# Patient Record
Sex: FEMALE | Race: ASIAN | NOT HISPANIC OR LATINO
[De-identification: names, ages, dates, MRNs, and addresses within clinical notes are randomized per-mention and may not be internally consistent; named-entity substitution may affect disease eponyms.]

---

## 2022-12-05 PROBLEM — Z00.00 ENCOUNTER FOR PREVENTIVE HEALTH EXAMINATION: Status: ACTIVE | Noted: 2022-12-05

## 2022-12-12 PROBLEM — Z86.79 HISTORY OF HYPERTENSION: Status: RESOLVED | Noted: 2022-12-12 | Resolved: 2022-12-12

## 2022-12-12 PROBLEM — Z86.39 HISTORY OF HYPERLIPIDEMIA: Status: RESOLVED | Noted: 2022-12-12 | Resolved: 2022-12-12

## 2022-12-12 PROBLEM — M48.02 CERVICAL SPINAL STENOSIS: Status: RESOLVED | Noted: 2022-12-12 | Resolved: 2022-12-12

## 2022-12-12 PROBLEM — D56.1 BETA+ THALASSEMIA: Status: RESOLVED | Noted: 2022-12-12 | Resolved: 2022-12-12

## 2022-12-12 RX ORDER — FLUTICASONE FUROATE AND VILANTEROL TRIFENATATE 200; 25 UG/1; UG/1
200-25 POWDER RESPIRATORY (INHALATION)
Refills: 0 | Status: ACTIVE | COMMUNITY

## 2022-12-12 RX ORDER — ESOMEPRAZOLE MAGNESIUM 40 MG/1
40 CAPSULE, DELAYED RELEASE ORAL
Refills: 0 | Status: ACTIVE | COMMUNITY

## 2022-12-12 RX ORDER — ALBUTEROL SULFATE 90 UG/1
108 (90 BASE) AEROSOL, METERED RESPIRATORY (INHALATION)
Refills: 0 | Status: ACTIVE | COMMUNITY

## 2022-12-12 RX ORDER — OLMESARTAN MEDOXOMIL 20 MG/1
20 TABLET, FILM COATED ORAL
Refills: 0 | Status: ACTIVE | COMMUNITY

## 2022-12-16 ENCOUNTER — APPOINTMENT (OUTPATIENT)
Dept: THORACIC SURGERY | Facility: CLINIC | Age: 67
End: 2022-12-16

## 2022-12-16 VITALS
DIASTOLIC BLOOD PRESSURE: 70 MMHG | BODY MASS INDEX: 23.74 KG/M2 | WEIGHT: 134 LBS | TEMPERATURE: 97.2 F | HEIGHT: 63 IN | SYSTOLIC BLOOD PRESSURE: 149 MMHG | RESPIRATION RATE: 17 BRPM | OXYGEN SATURATION: 94 % | HEART RATE: 84 BPM

## 2022-12-16 DIAGNOSIS — Z86.79 PERSONAL HISTORY OF OTHER DISEASES OF THE CIRCULATORY SYSTEM: ICD-10-CM

## 2022-12-16 DIAGNOSIS — R45.89 OTHER SYMPTOMS AND SIGNS INVOLVING EMOTIONAL STATE: ICD-10-CM

## 2022-12-16 DIAGNOSIS — F17.200 NICOTINE DEPENDENCE, UNSPECIFIED, UNCOMPLICATED: ICD-10-CM

## 2022-12-16 DIAGNOSIS — D56.1 BETA THALASSEMIA: ICD-10-CM

## 2022-12-16 DIAGNOSIS — Z86.39 PERSONAL HISTORY OF OTHER ENDOCRINE, NUTRITIONAL AND METABOLIC DISEASE: ICD-10-CM

## 2022-12-16 DIAGNOSIS — M48.02 SPINAL STENOSIS, CERVICAL REGION: ICD-10-CM

## 2022-12-16 PROCEDURE — 99203 OFFICE O/P NEW LOW 30 MIN: CPT

## 2022-12-21 PROBLEM — F17.200 CURRENT SMOKER: Status: ACTIVE | Noted: 2022-12-21

## 2022-12-21 PROBLEM — R45.89 DEPRESSED MOOD: Status: ACTIVE | Noted: 2022-12-21

## 2022-12-21 NOTE — PHYSICAL EXAM
[General Appearance - Alert] : alert [General Appearance - In No Acute Distress] : in no acute distress [Sclera] : the sclera and conjunctiva were normal [PERRL With Normal Accommodation] : pupils were equal in size, round, and reactive to light [Extraocular Movements] : extraocular movements were intact [Outer Ear] : the ears and nose were normal in appearance [Oropharynx] : the oropharynx was normal [Neck Appearance] : the appearance of the neck was normal [Neck Cervical Mass (___cm)] : no neck mass was observed [Jugular Venous Distention Increased] : there was no jugular-venous distention [Thyroid Diffuse Enlargement] : the thyroid was not enlarged [Thyroid Nodule] : there were no palpable thyroid nodules [Respiration, Rhythm And Depth] : normal respiratory rhythm and effort [Exaggerated Use Of Accessory Muscles For Inspiration] : no accessory muscle use [Bibasilar Rales/Crackles] : bibasilar rales [Heart Rate And Rhythm] : heart rate was normal and rhythm regular [Heart Sounds] : normal S1 and S2 [Heart Sounds Gallop] : no gallops [Murmurs] : no murmurs [Heart Sounds Pericardial Friction Rub] : no pericardial rub [Examination Of The Chest] : the chest was normal in appearance [Chest Visual Inspection Thoracic Asymmetry] : no chest asymmetry [Diminished Respiratory Excursion] : normal chest expansion [Bowel Sounds] : normal bowel sounds [Abdomen Soft] : soft [Abdomen Tenderness] : non-tender [Abdomen Mass (___ Cm)] : no abdominal mass palpated [Skin Color & Pigmentation] : normal skin color and pigmentation [Skin Turgor] : normal skin turgor [] : no rash [Deep Tendon Reflexes (DTR)] : deep tendon reflexes were 2+ and symmetric [Sensation] : the sensory exam was normal to light touch and pinprick [No Focal Deficits] : no focal deficits [Oriented To Time, Place, And Person] : oriented to person, place, and time [Impaired Insight] : insight and judgment were intact [Affect] : the affect was normal

## 2023-02-15 NOTE — HISTORY OF PRESENT ILLNESS
[FreeTextEntry1] : MOIRA ABAD is a 67 year old F, current smoker (10 packyear), with PMHx of HLD, HTN, Beta thalassemia, who is referred by Elyse Puga for an initial evaluation of left lower lobe subsolid nodule on CT chest. \par \par Patient reports chronic productive cough for more than 10 years, with shortness of breath on exertion. Symptoms on and off, worse in the winner time. She admits increased cigarettes consumption due to depressed mood. She denies recent unintentional weight loss, night sweats, fatigue, anorexia, or hemoptysis. She denies hx of lung TB or known exposure.  S/P oral antibiotic after the CT chest. She was evaluated by Pulm Dr. Tashi Vera, who suggested bronchoscopy+brushing. \par \par CT chest on 11/28/2022\par - Evidence of prior granulomatous disease.\par - Chronic mild bilateral lower lobe bronchiectasis.\par - Associated with the left lower lobe bronchiectasis, left lower lobe subsolid, largely ground-glass opacity at the left lateral costophrenic angle, slightly more confluent than on the prior study and definitely more prominent than in 2020. Medial basilar left lower lobe bandlike scarring, also progressed since 2020. \par - 2-year stability of tiny pulmonary nodules.\par \par CT chest on 05/04/2022\par - Subcentimeter nodules measuring up to 2 mm, similar to 2020.\par - Sequela of prior granulomatous disease.\par \par CT chest on 10/29/2020\par - No CT correlate for the apparent nodule seen in the right lower lung on prior chest radiograph.\par - Few 2 mm pulmonary nodules.  \par - Sequela of prior granulomatous disease.

## 2023-02-15 NOTE — ASSESSMENT
[FreeTextEntry1] : 67 year old F, current smoker (10 packyear), with PMHx of HLD, HTN, Beta thalassemia, who is referred by Elyse Puga for an initial evaluation of left lower lobe subsolid nodule on CT chest. \par \par CT chest on 11/28/2022 was reviewed and compared to previous ones. There is a subsolid, largely ground-glass opacity at the left lateral costophrenic angle, slightly more confluent than on the prior study and definitely more prominent than in 2020. Patient admits chronic productive coughs without  unintentional weight loss, night sweats, fatigue, anorexia, or hemoptysis.\par \par The etiology of the LLL finding is most likely chronic inflammation or infection, based on patient's history and other CT imaging findings (Chronic mild bilateral lower lobe bronchiectasis and progressed worse medial basilar left lower lobe bandlike scarring). She had received antibiotic treatment after taking the CT chest. Will continue observe and repeat a CT chest in three months. If the nodule increases in size or no improvement, will pursue a biopsy. Both patient and her  verbally understood and agreed with the plan. \par \par Plan: Chest CT w/o contrast in 3 months. \par \par Priti VANG FNP, am scribing for and the presence of Dr. LOIS BENNETT the following sections: history of present illness, past medical/family/surgical/family/social history, review of systems, vital signs, physical exam, and disposition.\par \par LOIS VANG, personally performed the services described in the documentation, reviewed the documentation recorded by the scribe in my presence and it accurately and completely records my words and actions.\par \par  \par \par \par \par \par

## 2023-02-15 NOTE — CONSULT LETTER
[FreeTextEntry2] : Elyse Puga [FreeTextEntry3] : Arley Ortiz MD\par Professor, Cardiovascular & Thoracic Surgery\par [employer] [employer]\par Director of the [facility] \par Director of Thoracic Surgery, [facility]\par \par [facility]\par [address]\par [facility] 4th Floor\par [place] [address]\par Phone: [phone]\par Fax: [phone]\par \par \par

## 2023-03-17 ENCOUNTER — APPOINTMENT (OUTPATIENT)
Dept: THORACIC SURGERY | Facility: CLINIC | Age: 68
End: 2023-03-17
Payer: MEDICARE

## 2023-03-17 VITALS
HEIGHT: 63 IN | TEMPERATURE: 97.6 F | SYSTOLIC BLOOD PRESSURE: 162 MMHG | HEART RATE: 73 BPM | OXYGEN SATURATION: 96 % | DIASTOLIC BLOOD PRESSURE: 76 MMHG | WEIGHT: 146 LBS | BODY MASS INDEX: 25.87 KG/M2 | RESPIRATION RATE: 18 BRPM

## 2023-03-17 PROCEDURE — 99213 OFFICE O/P EST LOW 20 MIN: CPT

## 2023-03-20 NOTE — PHYSICAL EXAM
[Auscultation Breath Sounds / Voice Sounds] : lungs were clear to auscultation bilaterally [Heart Rate And Rhythm] : heart rate was normal and rhythm regular [Heart Sounds] : normal S1 and S2 [Heart Sounds Gallop] : no gallops [Murmurs] : no murmurs [Heart Sounds Pericardial Friction Rub] : no pericardial rub [Examination Of The Chest] : the chest was normal in appearance [Chest Visual Inspection Thoracic Asymmetry] : no chest asymmetry [Diminished Respiratory Excursion] : normal chest expansion [Bowel Sounds] : normal bowel sounds [Abdomen Soft] : soft [Abdomen Tenderness] : non-tender [] : no hepato-splenomegaly [Abdomen Mass (___ Cm)] : no abdominal mass palpated [Abnormal Walk] : normal gait [Nail Clubbing] : no clubbing  or cyanosis of the fingernails [Musculoskeletal - Swelling] : no joint swelling seen [Motor Tone] : muscle strength and tone were normal [Oriented To Time, Place, And Person] : oriented to person, place, and time [Impaired Insight] : insight and judgment were intact [Affect] : the affect was normal

## 2023-04-11 NOTE — HISTORY OF PRESENT ILLNESS
[FreeTextEntry1] : The patient is a 67-year-old F, current smoker (10 packyear), with PMHx of HLD, HTN, Beta thalassemia, who was referred by Elyse Puga for an evaluation of left lower lobe subsolid nodule.\par \par At her last visit, based on CT imaging findings (Chronic mild bilateral lower lobe bronchiectasis and progressed worse medial basilar left lower lobe bandlike scarring), the opinion was the left lower lobe subsolid nodule is most likely chronic inflammation or infection. She had received antibiotic treatment after taking the CT chest. \par \par She presents today for a follow up visit with a repeat CT chest scan. \par \par CT chest completed on 03/15/23:\par -chronic mild left lower lobe bronchiectasis with associated parenchyma scarring and subsegmental atelectasis, similar in appearance to the prior examination of 2020. Previously described associated large subsolid ground-glass opacity in the left lateral costophrenic angle is less conspicuous on this examination. \par -stable subcentimeter lung nodules\par -sequela of prior granulomatous disease\par -continued CT surveillance is recommended \par \par

## 2023-04-11 NOTE — DATA REVIEWED
[FreeTextEntry1] : CT chest on 11/28/2022\par - Evidence of prior granulomatous disease.\par - Chronic mild bilateral lower lobe bronchiectasis.\par - Associated with the left lower lobe bronchiectasis, left lower lobe subsolid, largely ground-glass opacity at the left lateral costophrenic angle, slightly more confluent than on the prior study and definitely more prominent than in 2020. Medial basilar left lower lobe bandlike scarring, also progressed since 2020. \par - 2-year stability of tiny pulmonary nodules.\par \par CT chest on 05/04/2022\par - Subcentimeter nodules measuring up to 2 mm, similar to 2020.\par - Sequela of prior granulomatous disease.\par \par CT chest on 10/29/2020\par - No CT correlate for the apparent nodule seen in the right lower lung on prior chest radiograph.\par - Few 2 mm pulmonary nodules. \par - Sequela of prior granulomatous disease. \par

## 2023-04-11 NOTE — ASSESSMENT
[FreeTextEntry1] : The patient is a 67-year-old F, current smoker (10 packyear), with PMHx of HLD, HTN, Beta thalassemia, who was referred by Elyse Puga for an evaluation of left lower lobe subsolid nodule.\par \par At her last visit, based on CT imaging findings (Chronic mild bilateral lower lobe bronchiectasis and progressed worse medial basilar left lower lobe bandlike scarring), the opinion was the left lower lobe subsolid nodule is most likely chronic inflammation or infection. She had received antibiotic treatment after taking the CT chest. \par She presents today for a follow up visit with a repeat CT chest scan. \par \par Medical records and images reviewed today. CT chest completed on 03/15/23 revealed previously described associated large subsolid ground-glass opacity in the left lateral costophrenic angle is less conspicuous on this examination. Based on the imaging, the left lateral costophrenic angle opacity is likely inflammatory. Will plan a repeat CT scan in 6 months to monitor the lesion. \par \par Plan:\par 1.CT chest in 6 months \par 2.RTC in 6 months \par \par \par Gato VANG RN, am scribing for and the presence of Dr. LOIS BENNETT the following sections: history of present illness, past medical/family/surgical/family/social history, review of systems, vital signs, physical exam, and disposition.\par \par LOIS VANG, personally performed the services described in the documentation, reviewed the documentation recorded by the scribe in my presence and it accurately and completely records my words and actions.

## 2023-09-08 ENCOUNTER — APPOINTMENT (OUTPATIENT)
Dept: THORACIC SURGERY | Facility: CLINIC | Age: 68
End: 2023-09-08

## 2023-10-03 ENCOUNTER — APPOINTMENT (OUTPATIENT)
Dept: THORACIC SURGERY | Facility: CLINIC | Age: 68
End: 2023-10-03
Payer: MEDICARE

## 2023-10-03 DIAGNOSIS — R91.1 SOLITARY PULMONARY NODULE: ICD-10-CM

## 2023-10-03 PROCEDURE — 99442: CPT

## 2024-08-03 PROBLEM — D56.1 BETA+ THALASSEMIA: Status: RESOLVED | Noted: 2022-12-12 | Resolved: 2024-08-03
